# Patient Record
(demographics unavailable — no encounter records)

---

## 2024-12-19 NOTE — HISTORY OF PRESENT ILLNESS
[No change in the review of systems as noted in prior visit date ___] : No change in the review of systems as noted in prior visit date of [unfilled] [de-identified] : Madelyn is a 6yo F with SNHL No recent ear infections No otorrhea Wears hearing aids without issues Gets speech therapy at school  Hearing tested at [facility]  Discussed with mother on the phone and her hearing will be tested at [facility]   Mild snoring  No chronic nasal congestion

## 2024-12-19 NOTE — CONSULT LETTER
[Courtesy Letter:] : I had the pleasure of seeing your patient, [unfilled], in my office today. [Sincerely,] : Sincerely, [FreeTextEntry2] : MIGUEL MAYS [address] [place] [id number] [FreeTextEntry3] : Mookie Bright MD Chief, Pediatric Otolaryngology [facility] and [facility] Professor of Otolaryngology [facility] [employer] at [facility]

## 2024-12-19 NOTE — PHYSICAL EXAM
[1+] : 1+ [Normal Gait and Station] : normal gait and station [Normal muscle strength, symmetry and tone of facial, head and neck musculature] : normal muscle strength, symmetry and tone of facial, head and neck musculature [Normal] : no cervical lymphadenopathy [Complete] : complete cerumen impaction [Increased Work of Breathing] : no increased work of breathing with use of accessory muscles and retractions

## 2025-06-19 NOTE — PHYSICAL EXAM
[Complete] : complete cerumen impaction [1+] : 1+ [Increased Work of Breathing] : no increased work of breathing with use of accessory muscles and retractions [Normal Gait and Station] : normal gait and station [Normal muscle strength, symmetry and tone of facial, head and neck musculature] : normal muscle strength, symmetry and tone of facial, head and neck musculature [Normal] : no cervical lymphadenopathy

## 2025-06-19 NOTE — PROCEDURE
[FreeTextEntry1] :  Bilateral Cerumen Removal  [FreeTextEntry2] :  Bilateral Cerumen Impaction  [FreeTextEntry3] :  After informed verbal consent is obtained, binocular microscopy is used to remove cerumen from the left ear canal with a curette and suction.    After informed verbal consent is obtained, binocular microscopy is used to remove cerumen from the right ear canal with a curette and suction.

## 2025-06-19 NOTE — CONSULT LETTER
[Courtesy Letter:] : I had the pleasure of seeing your patient, [unfilled], in my office today. [Sincerely,] : Sincerely, [FreeTextEntry2] : MIGUEL MAYS [address] [place] [id number] [FreeTextEntry3] : Mookie Bright MD Chief, Pediatric Otolaryngology [facility] and [facility] Professor of Otolaryngology [facility] [employer] at [facility]

## 2025-06-19 NOTE — HISTORY OF PRESENT ILLNESS
[No change in the review of systems as noted in prior visit date ___] : No change in the review of systems as noted in prior visit date of [unfilled] [de-identified] : Madelyn is a 7yo F with SNHL No recent ear infections No otorrhea Wears hearing aids without issues Gets speech therapy at school  Hearing tested at [facility]  Discussed with mother and her hearing will be tested at [facility]   Mild snoring  No chronic nasal congestion